# Patient Record
(demographics unavailable — no encounter records)

---

## 2024-10-09 NOTE — PHYSICAL EXAM
Leonides is going to speak to patient    [Alert] : alert [No Acute Distress] : no acute distress [Normocephalic] : normocephalic [Anterior Sunnyside Closed] : anterior fontanelle closed [Red Reflex Bilateral] : red reflex bilateral [PERRL] : PERRL [Normally Placed Ears] : normally placed ears [Auricles Well Formed] : auricles well formed [Clear Tympanic membranes with present light reflex and bony landmarks] : clear tympanic membranes with present light reflex and bony landmarks [No Discharge] : no discharge [Nares Patent] : nares patent [Palate Intact] : palate intact [Uvula Midline] : uvula midline [Tooth Eruption] : tooth eruption  [Supple, full passive range of motion] : supple, full passive range of motion [No Palpable Masses] : no palpable masses [Symmetric Chest Rise] : symmetric chest rise [Clear to Auscultation Bilaterally] : clear to auscultation bilaterally [Regular Rate and Rhythm] : regular rate and rhythm [S1, S2 present] : S1, S2 present [No Murmurs] : no murmurs [+2 Femoral Pulses] : +2 femoral pulses [Soft] : soft [NonTender] : non tender [Non Distended] : non distended [Normoactive Bowel Sounds] : normoactive bowel sounds [No Hepatomegaly] : no hepatomegaly [No Splenomegaly] : no splenomegaly [Jules 1] : Jules 1 [No Clitoromegaly] : no clitoromegaly [Normal Vaginal Introitus] : normal vaginal introitus [Patent] : patent [Normally Placed] : normally placed [No Abnormal Lymph Nodes Palpated] : no abnormal lymph nodes palpated [No Clavicular Crepitus] : no clavicular crepitus [Symmetric Buttocks Creases] : symmetric buttocks creases [No Spinal Dimple] : no spinal dimple [NoTuft of Hair] : no tuft of hair [Cranial Nerves Grossly Intact] : cranial nerves grossly intact [No Rash or Lesions] : no rash or lesions

## 2024-10-09 NOTE — HISTORY OF PRESENT ILLNESS
[Mother] : mother [Normal] : Normal [Brushing teeth] : Brushing teeth [Vitamin] : Primary Fluoride Source: Vitamin [No] : Not at  exposure [Car seat in back seat] : Car seat in back seat [FreeTextEntry7] : Patient has been well [de-identified] : Regular diet

## 2024-10-09 NOTE — DEVELOPMENTAL MILESTONES
[Normal Development] : Normal Development [None] : none [Plays alongside other children] : plays alongside other children [Takes off some clothing] : takes off some clothing [Scoops well with spoon] : scoops well with spoon [Uses 50 words] : uses 50 words [Combine 2 words into phrase or] : combines 2 words into phrase or sentences [Follows 2-step command] : follows 2-step command [Uses words that are 50% intelligible] : uses words that are 50% intelligible to strangers [Kicks ball] : kicks ball  [Jumps off ground with 2 feet] : jumps off ground with 2 feet [Runs with coordination] : runs with coordination [Climbs up a ladder at a] : climbs up a ladder at a playground [Turns book pages] : turns book pages [Passed] : passed

## 2024-10-09 NOTE — BEGINNING OF VISIT
"YOUR NEXT PAIN MEDICATION IS DUE AT______________      Moderate Conscious Sedation, Pediatric, Care After  Refer to this sheet in the next few weeks. These instructions provide you with information on caring for your child after his or her procedure. Your child's health care provider may also give you more specific instructions. Your child's treatment has been planned according to current medical practices, but problems sometimes occur. Call your child's health care provider if you have any problems or questions after the procedure.  WHAT TO EXPECT AFTER THE PROCEDURE    · Your child may be sleepy, confused, and a little \"out of it\" for several hours after the procedure.  · Your child's coordination may be slightly impaired until the medicines used during the procedure have completely worn off.  · Your child may have muscle aches and a sore throat. This is very common after IV sedation and will usually go away within 24-36 hours.  · In the first 12 hours after IV sedation, your child may experience an increase in temperature. This is usually because one of the medicines given during the procedure prevents your child from sweating for a period of time.  HOME CARE INSTRUCTIONS  · For at least the next 24 hours and until your child is behaving normally again, you (or a responsible adult family member or friend) should observe and stay with your child. During this time:  ¨ Stay near your child, and comfort him or her as necessary.  ¨ Supervise all play or bathing.  ¨ Do not allow your child to walk or stand without your help. Often, children are unsteady on their feet for several hours after sedation.  ¨ Do not leave your child unattended at any time in a car seat. Two adults should be present when driving. One adult should drive. The other adult should sit next to the child in case the child becomes nauseous or falls asleep in an awkward position. If your child falls asleep in a car seat, make sure his or her head " [Mother] : mother remains upright. Watch your child continuously to make sure there are no breathing problems.  ¨ Do not leave your child alone while he or she is still sleepy, unless it is bedtime and the child has normal behavior before going to sleep.  ¨ Do not allow your child to ride a bicycle, skate, climb, swim, use swing sets, use machines, or participate in any activity where he or she could become injured.  · Do not give your child food or drink for at least 2 hours after the procedure. If your child is fully awake, is not vomiting, and does not feel nauseous after this period of time, he or she may drink and eat. When your child is ready, start by giving him or her water. After that, you may give your child clear fruit juice, sports drinks, or frozen ice pops. Give your child small portions to drink repeatedly. Avoid giving your child dairy products for the first 4-6 hours.  · Only give your child medicines for fever as directed by the health care provider. Do not give aspirin to children.  · Make sure you and those caring for your child understand everything about the medicine given to your child and what side effects may occur.  · Keep all follow-up appointments.  SEEK MEDICAL CARE IF:  Your child is still sleepy or wobbly after 24 hours.  SEEK IMMEDIATE MEDICAL CARE IF:  · Your child keeps vomiting.  · Your child develops a rash.  · Your child is difficult to wake up.  · Your child has trouble breathing.  · Your child who is younger than 3 months has a fever.  · Your child who is older than 3 months has a fever and persistent problems.  · Your child who is older than 3 months has a fever and problems suddenly get worse.     This information is not intended to replace advice given to you by your health care provider. Make sure you discuss any questions you have with your health care provider.     Document Released: 10/08/2014 Document Revised: 01/08/2016 Document Reviewed: 10/08/2014  [employer] Interactive Patient  Education ©2016 [employer]         CALL YOUR CHILD'S PHYSICIAN IF YOUR CHILD EXPERIENCES  INCREASED PAIN NOT HELPED BY THEIR PAIN MEDICATION.         Fall Prevention in the Home      Falls can cause injuries. They can happen to people of all ages. There are many things you can do to make your home safe and to help prevent falls.    WHAT CAN I DO ON THE OUTSIDE OF MY HOME?  · Regularly fix the edges of walkways and driveways and fix any cracks.  · Remove anything that might make you trip as you walk through a door, such as a raised step or threshold.  · Trim any bushes or trees on the path to your home.  · Use bright outdoor lighting.  · Clear any walking paths of anything that might make someone trip, such as rocks or tools.  · Regularly check to see if handrails are loose or broken. Make sure that both sides of any steps have handrails.  · Any raised decks and porches should have guardrails on the edges.  · Have any leaves, snow, or ice cleared regularly.  · Use sand or salt on walking paths during winter.  · Clean up any spills in your garage right away. This includes oil or grease spills.  WHAT CAN I DO IN THE BATHROOM?    · Use night lights.  · Install grab bars by the toilet and in the tub and shower. Do not use towel bars as grab bars.  · Use non-skid mats or decals in the tub or shower.  · If you need to sit down in the shower, use a plastic, non-slip stool.  · Keep the floor dry. Clean up any water that spills on the floor as soon as it happens.  · Remove soap buildup in the tub or shower regularly.  · Attach bath mats securely with double-sided non-slip rug tape.  · Do not have throw rugs and other things on the floor that can make you trip.  WHAT CAN I DO IN THE BEDROOM?  · Use night lights.  · Make sure that you have a light by your bed that is easy to reach.  · Do not use any sheets or blankets that are too big for your bed. They should not hang down onto the floor.  · Have a firm chair that has side  arms. You can use this for support while you get dressed.  · Do not have throw rugs and other things on the floor that can make you trip.  WHAT CAN I DO IN THE KITCHEN?  · Clean up any spills right away.  · Avoid walking on wet floors.  · Keep items that you use a lot in easy-to-reach places.  · If you need to reach something above you, use a strong step stool that has a grab bar.  · Keep electrical cords out of the way.  · Do not use floor polish or wax that makes floors slippery. If you must use wax, use non-skid floor wax.  · Do not have throw rugs and other things on the floor that can make you trip.  WHAT CAN I DO WITH MY STAIRS?  · Do not leave any items on the stairs.  · Make sure that there are handrails on both sides of the stairs and use them. Fix handrails that are broken or loose. Make sure that handrails are as long as the stairways.  · Check any carpeting to make sure that it is firmly attached to the stairs. Fix any carpet that is loose or worn.  · Avoid having throw rugs at the top or bottom of the stairs. If you do have throw rugs, attach them to the floor with carpet tape.  · Make sure that you have a light switch at the top of the stairs and the bottom of the stairs. If you do not have them, ask someone to add them for you.  WHAT ELSE CAN I DO TO HELP PREVENT FALLS?  · Wear shoes that:  ¨ Do not have high heels.  ¨ Have rubber bottoms.  ¨ Are comfortable and fit you well.  ¨ Are closed at the toe. Do not wear sandals.  · If you use a stepladder:  ¨ Make sure that it is fully opened. Do not climb a closed stepladder.  ¨ Make sure that both sides of the stepladder are locked into place.  ¨ Ask someone to hold it for you, if possible.  · Clearly sole and make sure that you can see:  ¨ Any grab bars or handrails.  ¨ First and last steps.  ¨ Where the edge of each step is.  · Use tools that help you move around (mobility aids) if they are needed. These  include:  ¨ Canes.  ¨ Walkers.  ¨ Scooters.  ¨ Crutches.  · Turn on the lights when you go into a dark area. Replace any light bulbs as soon as they burn out.  · Set up your furniture so you have a clear path. Avoid moving your furniture around.  · If any of your floors are uneven, fix them.  · If there are any pets around you, be aware of where they are.  · Review your medicines with your doctor. Some medicines can make you feel dizzy. This can increase your chance of falling.  Ask your doctor what other things that you can do to help prevent falls.     This information is not intended to replace advice given to you by your health care provider. Make sure you discuss any questions you have with your health care provider.     Document Released: 10/14/2010 Document Revised: 05/03/2016 Document Reviewed: 01/22/2016  [employer] Interactive Patient Education ©2016 [employer]     PARENT/GUARDIAN VERBALIZES UNDERSTANDING OF ABOVE EDUCATION. COPY OF PAIN SCALE GIVE AND REVIEWED WITH VERBALIZED UNDERSTANDING.

## 2024-12-30 NOTE — HISTORY OF PRESENT ILLNESS
[de-identified] : fever [FreeTextEntry6] : 2y2m old girl BIB parents with c/o fever to 104.7 today. Pt is without other symptoms. No SOB, difficulty breathing, chest pain, cough, congestion or URI sx. No n/v/d. No headache, abdominal pain, sore throat or rash. No body aches or fatigue. No urinary complaints. Good po/uop/bm. Normal sleep and activity.

## 2024-12-30 NOTE — DISCUSSION/SUMMARY
[FreeTextEntry1] : Anticipatory guidance and parent education given. Rapid Flu test negative in office. Symptoms likely due to viral illness. May use Tylenol or Ibuprofen as needed for fever or discomfort. Recommend supportive care including humidified air, increased fluids, rest, and nasal saline followed by nasal suction. Return if symptoms worsen or persist. F/U in 48-72 hours if fever persists.

## 2024-12-31 NOTE — DISCUSSION/SUMMARY
[FreeTextEntry1] : Anticipatory guidance and parent education given. Rapid strep test negative in office, throat culture sent to lab. Will follow up by phone and initiate antibiotic treatment if throat culture results are positive. Bladder catheterization performed in office under sterile conditions. UA wnl in office. Urine culture sent to lab, will f/u by phone when results are available. Increase fluid intake. Use of bubble baths and bath bombs discouraged. Follow up as needed for persistent or worsening symptoms.

## 2024-12-31 NOTE — PHYSICAL EXAM
[Erythematous Oropharynx] : erythematous oropharynx [Normal External Genitalia] : normal external genitalia [NL] : warm, clear

## 2024-12-31 NOTE — HISTORY OF PRESENT ILLNESS
[de-identified] : fever, vaginal pain [FreeTextEntry6] : 2y2m old girl BIB mother with c/o fever since yesterday Tmax 104.7. Pt has been intermittently c/o pain in her vaginal area, especially when being wiped. No rash or redness noted. No vaginal discharge. No obvious dysuria. No SOB, difficulty breathing, chest pain, cough, congestion or URI sx. No n/v/d. No headache, abdominal pain, sore throat or rash. No fatigue. Good po/uop/bm. Normal sleep and activity.

## 2025-02-07 NOTE — DISCUSSION/SUMMARY
[FreeTextEntry1] : Symptoms likely due to viral URI. Recommend supportive care including antipyretics, fluids, and nasal saline followed by nasal suction. Return if symptoms worsen or persist. - Symptomatic treatment - Cool moist air /Humidifier - Saline drops - Discussed maintaining adequate hydration - Handwashing and infection control discussed - Close observation advised for worsening symptoms - Return to the office if condition worsens or new symptoms arise - Go to the emergency room if condition worsens and unable to get to the office or during after hours - Next Visit: as needed or if temp > 48 hours

## 2025-02-07 NOTE — HISTORY OF PRESENT ILLNESS
[FreeTextEntry6] : Pt BIB Mother for c/o fussiness and congestion  congestion x 3 days fevers tmax 102 Tuesday - Wednesday resolved now Mother reports waking over night the past few nights and seems more fussy today decreased appetite but tolerating fluids with good UOP denies SOB, wheezing or trouble breathing  denies n/v/d, ST or rash

## 2025-04-16 NOTE — HISTORY OF PRESENT ILLNESS
[Parents] : parents [___ stools per day] : [unfilled]  stools per day [Normal] : Normal [Brushing teeth] : Brushing teeth [Yes] : Patient goes to dentist yearly [Vitamin] : Primary Fluoride Source: Vitamin [In nursery school] : In nursery school [No] : Not at  exposure [Car seat in back seat] : Car seat in back seat [FreeTextEntry7] : Patient has been well [de-identified] : Regular diet

## 2025-04-16 NOTE — PHYSICAL EXAM

## 2025-04-16 NOTE — DEVELOPMENTAL MILESTONES
[Normal Development] : Normal Development [None] : none [Urinates in a potty or toilet] : urinates in a potty or toilet [Plays pretend with toys or dolls] : plays pretend with toys or dolls [Pokes food with fork] : pokes food with fork [Uses pronouns correctly] : uses pronouns correctly [Explains the reason for things,] : explains the reason for things, such as needing a sweater when it's cold [Names at least one color] : names at least one color [Walks up steps, using one] : walks up steps, using one foot, then the other [Runs well without falling] : runs well without falling [Yes] : Completed.

## 2025-04-16 NOTE — DISCUSSION/SUMMARY
[Normal Growth] : growth [Normal Development] : development [None] : No known medical problems [No Elimination Concerns] : elimination [No Feeding Concerns] : feeding [No Skin Concerns] : skin [Normal Sleep Pattern] : sleep [Family Routines] : family routines [Language Promotion and Communication] : language promotion and communication [Social Development] : social development [ Considerations] :  considerations [Safety] : safety [No Medications] : ~He/She~ is not on any medications [Parent/Guardian] : parent/guardian [FreeTextEntry1] : Discussed patient's growth and development. Immunizations discussed. Return to office for  next well  or p.r.n.. Parent understand the plan